# Patient Record
Sex: FEMALE | Race: AMERICAN INDIAN OR ALASKA NATIVE
[De-identification: names, ages, dates, MRNs, and addresses within clinical notes are randomized per-mention and may not be internally consistent; named-entity substitution may affect disease eponyms.]

---

## 2020-09-10 NOTE — HISTORY AND PHYSICAL REPORT
History of Present Illness


Date of examination: 20


Chief complaint: 





D&C for missed AB


History of present illness: 





34 yo  with known missed AB for di/di gestation c/b Class III obesity, AMA,

mild intermittent asthma, HTN (on nifedipine), hx HSVII presenting for scheduled

D&C.  No passage of tissue.  Desiring surgical management.





Past History


Past Medical History: asthma, hypertension, other (obestiy)


Past Surgical History: breast surgery, other (abdominoplasty)


GYN History: herpes


Family/Genetic History: none


Social history: other (hx MJ use)





- Obstetrical History


: 3


Para: 1


Hx # Term Pregnancies: 1


Spontaneous Abortions: 1





Review of Systems


All systems: negative (expect HPI)





- Physical Exam


Abdomen: Positive: normal appearance, other (obese)





Results


All other labs normal.








Assessment and Plan





- Patient Problems


(1) Missed  with fetal demise before 20 completed weeks of gestation


Status: Acute   


Plan to address problem: 


--For scheduled D&C


--Consented in the chart








(2) Chronic hypertension


Status: Acute   


Plan to address problem: 


--Continue home nifedipine

## 2020-09-11 ENCOUNTER — HOSPITAL ENCOUNTER (OUTPATIENT)
Dept: HOSPITAL 5 - OR | Age: 35
Discharge: HOME | End: 2020-09-11
Attending: OBSTETRICS & GYNECOLOGY
Payer: MEDICAID

## 2020-09-11 VITALS — SYSTOLIC BLOOD PRESSURE: 114 MMHG | DIASTOLIC BLOOD PRESSURE: 69 MMHG

## 2020-09-11 DIAGNOSIS — Z72.89: ICD-10-CM

## 2020-09-11 DIAGNOSIS — J45.909: ICD-10-CM

## 2020-09-11 DIAGNOSIS — I10: ICD-10-CM

## 2020-09-11 DIAGNOSIS — Z3A.12: ICD-10-CM

## 2020-09-11 DIAGNOSIS — E66.9: ICD-10-CM

## 2020-09-11 DIAGNOSIS — O02.1: Primary | ICD-10-CM

## 2020-09-11 DIAGNOSIS — Z79.899: ICD-10-CM

## 2020-09-11 DIAGNOSIS — Z98.890: ICD-10-CM

## 2020-09-11 LAB
BASOPHILS # (AUTO): 0 K/MM3 (ref 0–0.1)
BASOPHILS NFR BLD AUTO: 0.5 % (ref 0–1.8)
EOSINOPHIL # BLD AUTO: 0.1 K/MM3 (ref 0–0.4)
EOSINOPHIL NFR BLD AUTO: 1.3 % (ref 0–4.3)
HCT VFR BLD CALC: 28 % (ref 30.3–42.9)
HGB BLD-MCNC: 8.8 GM/DL (ref 10.1–14.3)
LYMPHOCYTES # BLD AUTO: 1.8 K/MM3 (ref 1.2–5.4)
LYMPHOCYTES NFR BLD AUTO: 17.4 % (ref 13.4–35)
MCHC RBC AUTO-ENTMCNC: 32 % (ref 30–34)
MCV RBC AUTO: 64 FL (ref 79–97)
MONOCYTES # (AUTO): 0.4 K/MM3 (ref 0–0.8)
MONOCYTES % (AUTO): 3.4 % (ref 0–7.3)
PLATELET # BLD: 378 K/MM3 (ref 140–440)
RBC # BLD AUTO: 4.41 M/MM3 (ref 3.65–5.03)

## 2020-09-11 PROCEDURE — 86901 BLOOD TYPING SEROLOGIC RH(D): CPT

## 2020-09-11 PROCEDURE — 76998 US GUIDE INTRAOP: CPT

## 2020-09-11 PROCEDURE — 85025 COMPLETE CBC W/AUTO DIFF WBC: CPT

## 2020-09-11 PROCEDURE — 36415 COLL VENOUS BLD VENIPUNCTURE: CPT

## 2020-09-11 PROCEDURE — 86900 BLOOD TYPING SEROLOGIC ABO: CPT

## 2020-09-11 PROCEDURE — 88305 TISSUE EXAM BY PATHOLOGIST: CPT

## 2020-09-11 PROCEDURE — 76815 OB US LIMITED FETUS(S): CPT

## 2020-09-11 PROCEDURE — 59820 CARE OF MISCARRIAGE: CPT

## 2020-09-11 PROCEDURE — 86850 RBC ANTIBODY SCREEN: CPT

## 2020-09-11 PROCEDURE — 76857 US EXAM PELVIC LIMITED: CPT

## 2020-09-11 RX ADMIN — HYDROMORPHONE HYDROCHLORIDE PRN MG: 1 INJECTION, SOLUTION INTRAMUSCULAR; INTRAVENOUS; SUBCUTANEOUS at 16:41

## 2020-09-11 RX ADMIN — HYDROMORPHONE HYDROCHLORIDE PRN MG: 1 INJECTION, SOLUTION INTRAMUSCULAR; INTRAVENOUS; SUBCUTANEOUS at 16:11

## 2020-09-11 RX ADMIN — HYDROMORPHONE HYDROCHLORIDE PRN MG: 1 INJECTION, SOLUTION INTRAMUSCULAR; INTRAVENOUS; SUBCUTANEOUS at 16:21

## 2020-09-11 NOTE — POST OPERATIVE NOTE
Pre-op diagnosis: Missed 


Post-op diagnosis: same


Findings: 





1. 12 week uterus


2. Moderate amounts of products of conception


Procedure: 





Dilation and curettage


Anesthesia: GETA


Surgeon: MALIK CAR JR


Estimated blood loss: other (25cc)


Pathology: list (Moderate amounts of products of contraception)


Specimen disposition: to lab


Condition: stable


Disposition: PACU

## 2020-09-11 NOTE — ANESTHESIA CONSULTATION
Anesthesia Consult and Med Hx


Date of service: 09/11/20





- Airway


Anesthetic Teeth Evaluation: Good


ROM Head & Neck: Adequate


Mental/Hyoid Distance: Adequate


Mallampati Class: Class II


Intubation Access Assessment: Good





- Pre-Operative Health Status


ASA Pre-Surgery Classification: ASA3


Proposed Anesthetic Plan: General





- Pulmonary


Hx Asthma: Yes (Not treated since age 13)





- Cardiovascular System


Hx Hypertension: Yes





- Central Nervous System


Hx Psychiatric Problems: No





- Hematic


Hx Anemia: Yes (8.8)





- Other Systems


Hx Alcohol Use: Yes (Quit 4 mos ago)


Hx Substance Use: Yes (Marijuana quit 4 mos ago)


Hx Cancer: No


Hx Obesity: Yes





- Additional Comments


Anesthesia Medical History Comments: Pt seen at 1204PM

## 2020-09-11 NOTE — PROCEDURE NOTE
Date of procedure: 20


Pre-op diagnosis: Missed 


Post-op diagnosis: same


Procedure: 





Preoperative diagnosis:


1.  Missed  


Postoperative diagnosis:


1.  Same





Operation performed:


1.  Examined under anesthesia


2.  Dilation & curettage





Surgeon: Malik Car


Anesthesia: General


EBL: 25 cc


Pathology specimens: Uterine contents 


Complications: none


Disposition and condition: To the PACU in stable condition then discharged home


Findings:


1.  12 week size mobile uterus


2.  Moderate amount of products of conception





Statement of medical necessity: 35-year-old -0-1-1 at 11 weeks with missed 

 of di-di-twin gestation.  She desired surgical management.  The 

procedure risk benefits, indications and alternatives reviewed patient.





Description of operation: After informed consent, the patient was taken to the 

OR and placed in Dagoberto stirrups after general anesthesia was administered.  An 

exam under anesthesia was performed with the findings noted above.  The vagina 

was prepped and draped in the usual sterile fashion.  Did not catheterization of

the bladder was performed.  A duckbill speculum was placed to visualize the 

cervix.  A single-tooth tenaculum was placed onto the anterior cervical lip.  

Serial dilation of the cervix with Kim dilators was performed.  The uterus was

gently sounded to 10 centimeters.  Suction was calibrated to 60 mmHg, and a 14 

millimeters curette was gently advanced into the uterine cavity fundus.  Suction

was applied, and the curette was rotated to evacuate the uterus of products of 

conception.  A sharp curettage was performed until a gritty texture was noted.  

Suction curettage was repeated to clear the remaining products of conception.  

Minimal bleeding was noted.  The tenaculum was removed from the cervix with good

hemostasis noted.  The speculum was removed.  Patient tolerated the procedure 

well and was taken to recovery room in good condition.








Anesthesia: GETA


Surgeon: MALIK CAR JR


Estimated blood loss: other (25)


IV fluids: 1,000


Urine output: 25


Pathology: list (Products of contraception)


Specimen disposition: to lab


Condition: stable


Disposition: PACU

## 2020-09-11 NOTE — ULTRASOUND REPORT
Limited OB Ultrasound



HISTORY: d c of twins gestation.



TECHNIQUE:  Grayscale and color  imaging performed.



COMPARISON:  None



FINDINGS: Uterus measures 13 x 9.2 x 10.3 cm and appears heterogeneous. There was limited imaging wit
h endometrial echocomplex measuring 2.3 cm. No gross complication identified. No gross intrauterine m
ass.



IMPRESSION: Limited imaging provided for intraoperative guidance.



Signer Name: Werner Bell MD 

Signed: 9/11/2020 7:17 PM

Workstation Name: [id number]

## 2021-01-21 ENCOUNTER — OFFICE VISIT (OUTPATIENT)
Dept: URBAN - METROPOLITAN AREA CLINIC 23 | Facility: CLINIC | Age: 36
End: 2021-01-21
Payer: MEDICAID

## 2021-01-21 DIAGNOSIS — D50.9 MICROCYTIC ANEMIA: ICD-10-CM

## 2021-01-21 DIAGNOSIS — N85.8 UTERINE MASS: ICD-10-CM

## 2021-01-21 DIAGNOSIS — R11.0 NAUSEA: ICD-10-CM

## 2021-01-21 DIAGNOSIS — N92.0 MENORRHAGIA: ICD-10-CM

## 2021-01-21 DIAGNOSIS — E66.9 OBESITY: ICD-10-CM

## 2021-01-21 DIAGNOSIS — R68.81 EARLY SATIETY: ICD-10-CM

## 2021-01-21 DIAGNOSIS — R10.13 DYSPEPSIA: ICD-10-CM

## 2021-01-21 DIAGNOSIS — K92.0 HEMATEMESIS: ICD-10-CM

## 2021-01-21 DIAGNOSIS — R10.10 UPPER ABDOMINAL PAIN: ICD-10-CM

## 2021-01-21 PROBLEM — 414916001 OBESITY: Status: ACTIVE | Noted: 2021-01-21

## 2021-01-21 PROBLEM — 386692008 MENORRHAGIA: Status: ACTIVE | Noted: 2021-01-21

## 2021-01-21 PROCEDURE — G8417 CALC BMI ABV UP PARAM F/U: HCPCS | Performed by: INTERNAL MEDICINE

## 2021-01-21 PROCEDURE — G9903 PT SCRN TBCO ID AS NON USER: HCPCS | Performed by: INTERNAL MEDICINE

## 2021-01-21 PROCEDURE — G8427 DOCREV CUR MEDS BY ELIG CLIN: HCPCS | Performed by: INTERNAL MEDICINE

## 2021-01-21 PROCEDURE — G8482 FLU IMMUNIZE ORDER/ADMIN: HCPCS | Performed by: INTERNAL MEDICINE

## 2021-01-21 PROCEDURE — 99204 OFFICE O/P NEW MOD 45 MIN: CPT | Performed by: INTERNAL MEDICINE

## 2021-01-21 NOTE — HPI-TODAY'S VISIT:
- 36 yo  female, self-referred for further evaluation of GI complaints as detailed below - States she has been experiencing abdominal pain for about 1 month now.  The pain is intermittent but occurs daily.  She describes epigastric pain which varies between burning and a pressure sensation.  The pain is not associated with eating.  She gets the pains randomly, about 4-5 times per day.  The pains may last 2-3 hours. - Associated symptoms include nausea, dyspepsia, and early satiety.  Does not take ASA or NSAIDs. - States she has had hematemesis twice over the past 3 weeks.  The first time was on January 8, and she went to the ER at [facility].  Bloodwork was notable for microcytic anemia with a Hb of 10, MCV 58.  Abdominopelvic CT was notable for a large uterine mass measuring 8 cm, most likely a leiomyoma vs. other.  Patient states she saw a gynecologist this morning and that they are considering surgical excision.  States she has heavy menses which last about 8 days.  She denies hematochezia. - Denies family history of GI malignancies in any first degree relatives

## 2021-01-22 ENCOUNTER — OFFICE VISIT (OUTPATIENT)
Dept: URBAN - METROPOLITAN AREA CLINIC 22 | Facility: CLINIC | Age: 36
End: 2021-01-22
Payer: MEDICAID

## 2021-01-22 DIAGNOSIS — R10.84 GENERALIZED ABDOMINAL PAIN: ICD-10-CM

## 2021-01-22 DIAGNOSIS — K80.20 CHOLELITHIASIS: ICD-10-CM

## 2021-01-22 PROCEDURE — 76705 ECHO EXAM OF ABDOMEN: CPT | Performed by: INTERNAL MEDICINE

## 2021-02-08 ENCOUNTER — OFFICE VISIT (OUTPATIENT)
Dept: URBAN - METROPOLITAN AREA CLINIC 23 | Facility: CLINIC | Age: 36
End: 2021-02-08

## 2021-02-18 ENCOUNTER — OFFICE VISIT (OUTPATIENT)
Dept: URBAN - METROPOLITAN AREA SURGERY CENTER 14 | Facility: SURGERY CENTER | Age: 36
End: 2021-02-18

## 2021-07-26 ENCOUNTER — TELEPHONE ENCOUNTER (OUTPATIENT)
Dept: URBAN - METROPOLITAN AREA CLINIC 23 | Facility: CLINIC | Age: 36
End: 2021-07-26

## 2021-07-26 RX ORDER — FAMOTIDINE 20 MG/1
1 TABLET, 30 MINUTES BEFORE BREAKFAST AND DINNER TABLET, FILM COATED ORAL TWICE A DAY
Qty: 180 | Refills: 1

## 2021-08-17 ENCOUNTER — OFFICE VISIT (OUTPATIENT)
Dept: URBAN - METROPOLITAN AREA SURGERY CENTER 14 | Facility: SURGERY CENTER | Age: 36
End: 2021-08-17
Payer: MEDICAID

## 2021-08-17 DIAGNOSIS — K29.60 ADENOPAPILLOMATOSIS GASTRICA: ICD-10-CM

## 2021-08-17 DIAGNOSIS — K92.0 BLOODY EMESIS: ICD-10-CM

## 2021-08-17 DIAGNOSIS — K31.89 ACQUIRED DEFORMITY OF DUODENUM: ICD-10-CM

## 2021-08-17 DIAGNOSIS — K21.00 ALKALINE REFLUX ESOPHAGITIS: ICD-10-CM

## 2021-08-17 PROCEDURE — 43239 EGD BIOPSY SINGLE/MULTIPLE: CPT | Performed by: INTERNAL MEDICINE

## 2021-08-17 PROCEDURE — G8907 PT DOC NO EVENTS ON DISCHARG: HCPCS | Performed by: INTERNAL MEDICINE

## 2021-08-17 RX ORDER — FAMOTIDINE 20 MG/1
1 TABLET, 30 MINUTES BEFORE BREAKFAST AND DINNER TABLET, FILM COATED ORAL TWICE A DAY
Qty: 180 | Refills: 1 | Status: ACTIVE | COMMUNITY

## 2021-08-19 ENCOUNTER — OFFICE VISIT (OUTPATIENT)
Dept: URBAN - METROPOLITAN AREA CLINIC 23 | Facility: CLINIC | Age: 36
End: 2021-08-19

## 2021-08-31 ENCOUNTER — TELEPHONE ENCOUNTER (OUTPATIENT)
Dept: URBAN - METROPOLITAN AREA CLINIC 92 | Facility: CLINIC | Age: 36
End: 2021-08-31

## 2021-08-31 PROBLEM — 234349007 MICROCYTIC ANEMIA: Status: ACTIVE | Noted: 2021-01-21

## 2021-09-24 ENCOUNTER — TELEPHONE ENCOUNTER (OUTPATIENT)
Dept: URBAN - METROPOLITAN AREA CLINIC 23 | Facility: CLINIC | Age: 36
End: 2021-09-24

## 2021-09-24 RX ORDER — DICYCLOMINE HYDROCHLORIDE 20 MG/1
1 TABLET TABLET ORAL
Qty: 360 | Refills: 3 | OUTPATIENT
Start: 2021-09-24 | End: 2022-09-19

## 2021-09-24 RX ORDER — FAMOTIDINE 20 MG/1
1 TABLET, 30 MINUTES BEFORE BREAKFAST AND DINNER TABLET, FILM COATED ORAL TWICE A DAY
Qty: 180 | Refills: 1 | Status: ACTIVE | COMMUNITY

## 2021-09-27 ENCOUNTER — OFFICE VISIT (OUTPATIENT)
Dept: URBAN - METROPOLITAN AREA CLINIC 25 | Facility: CLINIC | Age: 36
End: 2021-09-27

## 2021-09-28 ENCOUNTER — TELEPHONE ENCOUNTER (OUTPATIENT)
Dept: URBAN - METROPOLITAN AREA CLINIC 23 | Facility: CLINIC | Age: 36
End: 2021-09-28

## 2021-10-25 ENCOUNTER — OFFICE VISIT (OUTPATIENT)
Dept: URBAN - METROPOLITAN AREA CLINIC 23 | Facility: CLINIC | Age: 36
End: 2021-10-25

## 2022-03-28 ENCOUNTER — TELEPHONE ENCOUNTER (OUTPATIENT)
Dept: URBAN - METROPOLITAN AREA CLINIC 96 | Facility: CLINIC | Age: 37
End: 2022-03-28

## 2022-03-28 RX ORDER — DICYCLOMINE HYDROCHLORIDE 20 MG/1
1 TABLET TABLET ORAL
Qty: 360 | Refills: 0
Start: 2021-09-24 | End: 2022-07-16

## 2022-05-31 ENCOUNTER — WEB ENCOUNTER (OUTPATIENT)
Dept: URBAN - METROPOLITAN AREA CLINIC 25 | Facility: CLINIC | Age: 37
End: 2022-05-31

## 2022-05-31 ENCOUNTER — OFFICE VISIT (OUTPATIENT)
Dept: URBAN - METROPOLITAN AREA CLINIC 25 | Facility: CLINIC | Age: 37
End: 2022-05-31
Payer: MEDICAID

## 2022-05-31 DIAGNOSIS — R10.9 UNSPECIFIED ABDOMINAL PAIN: ICD-10-CM

## 2022-05-31 DIAGNOSIS — G89.29 OTHER CHRONIC PAIN: ICD-10-CM

## 2022-05-31 DIAGNOSIS — K52.839 MICROSCOPIC COLITIS, UNSPECIFIED MICROSCOPIC COLITIS TYPE: ICD-10-CM

## 2022-05-31 PROBLEM — 235753003: Status: ACTIVE | Noted: 2022-05-31

## 2022-05-31 PROBLEM — 82423001: Status: ACTIVE | Noted: 2022-05-31

## 2022-05-31 PROCEDURE — 99213 OFFICE O/P EST LOW 20 MIN: CPT | Performed by: INTERNAL MEDICINE

## 2022-05-31 PROCEDURE — 83516 IMMUNOASSAY NONANTIBODY: CPT | Performed by: INTERNAL MEDICINE

## 2022-05-31 RX ORDER — DICYCLOMINE HYDROCHLORIDE 20 MG/1
2 CAPSULES TABLET ORAL
Qty: 180 CAPSULE | Refills: 4 | OUTPATIENT
Start: 2022-05-31 | End: 2022-10-28

## 2022-05-31 NOTE — HPI-TODAY'S VISIT:
38 yo pt presents for evaluation of chronic abd pain with unremarkable w/u thus far except for biopsy proven microscopic colitis. Sxs have been present for several months. Denies f/c/n/v/wt loss.

## 2022-08-18 ENCOUNTER — OFFICE VISIT (OUTPATIENT)
Dept: URBAN - METROPOLITAN AREA CLINIC 25 | Facility: CLINIC | Age: 37
End: 2022-08-18

## 2022-08-18 RX ORDER — DICYCLOMINE HYDROCHLORIDE 20 MG/1
2 CAPSULES TABLET ORAL
Qty: 180 CAPSULE | Refills: 4 | COMMUNITY
Start: 2022-05-31 | End: 2022-10-28

## 2022-09-08 ENCOUNTER — OFFICE VISIT (OUTPATIENT)
Dept: URBAN - METROPOLITAN AREA CLINIC 25 | Facility: CLINIC | Age: 37
End: 2022-09-08

## 2023-09-28 ENCOUNTER — OFFICE VISIT (OUTPATIENT)
Dept: URBAN - METROPOLITAN AREA CLINIC 25 | Facility: CLINIC | Age: 38
End: 2023-09-28

## 2024-03-18 ENCOUNTER — OV EP (OUTPATIENT)
Dept: URBAN - METROPOLITAN AREA CLINIC 25 | Facility: CLINIC | Age: 39
End: 2024-03-18

## 2024-03-18 NOTE — HPI-TODAY'S VISIT:
March 2024 visit:Previous workup includes a EGD in 2021 with Dr. Wise that revealed a small hiatal hernia, irregular Z-line, increased intraepithelial lymphocytes noted on small bowel biopsy, no H. pylori infection, reflux esophagitis with no intestinal metaplasia.  Abdominal ultrasound in 2021 revealed gallstones, normal liver, patent portal vein

## 2024-05-10 ENCOUNTER — LAB OUTSIDE AN ENCOUNTER (OUTPATIENT)
Dept: URBAN - METROPOLITAN AREA CLINIC 92 | Facility: CLINIC | Age: 39
End: 2024-05-10

## 2024-05-10 ENCOUNTER — DASHBOARD ENCOUNTERS (OUTPATIENT)
Age: 39
End: 2024-05-10

## 2024-05-10 ENCOUNTER — OFFICE VISIT (OUTPATIENT)
Dept: URBAN - METROPOLITAN AREA CLINIC 92 | Facility: CLINIC | Age: 39
End: 2024-05-10
Payer: MEDICAID

## 2024-05-10 VITALS
DIASTOLIC BLOOD PRESSURE: 103 MMHG | TEMPERATURE: 97.1 F | SYSTOLIC BLOOD PRESSURE: 162 MMHG | HEIGHT: 65 IN | WEIGHT: 221 LBS | BODY MASS INDEX: 36.82 KG/M2 | HEART RATE: 69 BPM

## 2024-05-10 DIAGNOSIS — R10.84 DIFFUSE ABDOMINAL PAIN: ICD-10-CM

## 2024-05-10 PROCEDURE — 99214 OFFICE O/P EST MOD 30 MIN: CPT | Performed by: INTERNAL MEDICINE

## 2024-05-10 RX ORDER — LABETALOL HYDROCHLORIDE 300 MG/1
1 TABLET TABLET, FILM COATED ORAL TWICE A DAY
Status: ACTIVE | COMMUNITY

## 2024-05-13 LAB
A/G RATIO: 1.4
ABSOLUTE BASOPHILS: 41
ABSOLUTE EOSINOPHILS: 324
ABSOLUTE LYMPHOCYTES: 1884
ABSOLUTE MONOCYTES: 290
ABSOLUTE NEUTROPHILS: 4361
ALBUMIN: 4.2
ALKALINE PHOSPHATASE: 64
ALT (SGPT): 6
AST (SGOT): 12
BASOPHILS: 0.6
BILIRUBIN, TOTAL: 0.6
BUN/CREATININE RATIO: (no result)
BUN: 10
CALCIUM: 9.2
CARBON DIOXIDE, TOTAL: 22
CBC MORPHOLOGY: (no result)
CHLORIDE: 107
CREATININE: 0.71
EGFR: 110
EOSINOPHILS: 4.7
GLOBULIN, TOTAL: 2.9
GLUCOSE: 92
HEMATOCRIT: 39.3
HEMOGLOBIN: 11.5
IMMUNOGLOBULIN A: 146
INTERPRETATION: (no result)
LIPASE: 29
LYMPHOCYTES: 27.3
MCH: 20.1
MCHC: 29.3
MCV: 68.8
MONOCYTES: 4.2
MPV: 12
NEUTROPHILS: 63.2
PLATELET COUNT: 351
POTASSIUM: 3.9
PROTEIN, TOTAL: 7.1
RDW: 17.1
RED BLOOD CELL COUNT: 5.71
SODIUM: 141
TISSUE TRANSGLUTAMINASE AB, IGA: <1
WHITE BLOOD CELL COUNT: 6.9

## 2024-06-19 ENCOUNTER — TELEPHONE ENCOUNTER (OUTPATIENT)
Dept: URBAN - METROPOLITAN AREA CLINIC 92 | Facility: CLINIC | Age: 39
End: 2024-06-19

## 2024-09-09 ENCOUNTER — LAB OUTSIDE AN ENCOUNTER (OUTPATIENT)
Dept: URBAN - METROPOLITAN AREA CLINIC 92 | Facility: CLINIC | Age: 39
End: 2024-09-09

## 2024-09-09 ENCOUNTER — OFFICE VISIT (OUTPATIENT)
Dept: URBAN - METROPOLITAN AREA CLINIC 92 | Facility: CLINIC | Age: 39
End: 2024-09-09
Payer: MEDICAID

## 2024-09-09 VITALS
HEART RATE: 66 BPM | SYSTOLIC BLOOD PRESSURE: 158 MMHG | BODY MASS INDEX: 35.52 KG/M2 | HEIGHT: 65 IN | TEMPERATURE: 97.2 F | DIASTOLIC BLOOD PRESSURE: 103 MMHG | WEIGHT: 213.2 LBS

## 2024-09-09 DIAGNOSIS — R10.84 DIFFUSE ABDOMINAL PAIN: ICD-10-CM

## 2024-09-09 DIAGNOSIS — R10.13 EPIGASTRIC ABDOMINAL PAIN: ICD-10-CM

## 2024-09-09 DIAGNOSIS — R89.7 ABNORMAL BIOPSY RESULT: ICD-10-CM

## 2024-09-09 PROCEDURE — 99214 OFFICE O/P EST MOD 30 MIN: CPT | Performed by: INTERNAL MEDICINE

## 2024-09-09 RX ORDER — OMEPRAZOLE 40 MG/1
1 CAPSULE 30 MINUTES BEFORE MORNING MEAL CAPSULE, DELAYED RELEASE ORAL ONCE A DAY
Qty: 90 | Refills: 3 | OUTPATIENT
Start: 2024-09-09

## 2024-09-09 RX ORDER — LABETALOL HYDROCHLORIDE 300 MG/1
1 TABLET TABLET, FILM COATED ORAL TWICE A DAY
Status: ACTIVE | COMMUNITY

## 2024-09-09 NOTE — HPI-TODAY'S VISIT:
March 2024 visit:Previous workup includes a EGD in 2021 with Dr. Wise that revealed a small hiatal hernia, irregular Z-line, increased intraepithelial lymphocytes noted on small bowel biopsy, no H. pylori infection, reflux esophagitis with no intestinal metaplasia.  Abdominal ultrasound in 2021 revealed gallstones, normal liver, patent portal vein  ***5/2024: Notes 6 years of abdominal pain. She never had celiac testing, and she did not see Surgery. She gets debilitating periods of generalized abdominal pain that persists for 3 weeks, and is associated with nausea. Then improves spontaneously. Hx of fibroids. BMs are regular, occ constipation. Pain worse with movement. No improvement with famotidine, Bentyl. Losing weight, unk how much. Does not like to eat when she is in pain. No CRC in the fam.  ***9/2024: No appetite, bloated, central abdominal pain. Constipated for a few days, and pain and appetite improves w/BM, but does note ongoing bloating. Vomited 2 days ago. Occasional nausea. Has lost 10# due to poor appetite. She is not eating 2/2 pain, although pain does not get worse with PO intake. CTAP 5/2024 with fibroids. RUQ US 2021 with gallstones. Went to ER with CP - thought may be GI in etiology, but sending to Cards. Celiac testing neg.

## 2024-10-04 ENCOUNTER — OFFICE VISIT (OUTPATIENT)
Dept: URBAN - METROPOLITAN AREA CLINIC 91 | Facility: CLINIC | Age: 39
End: 2024-10-04
Payer: MEDICAID

## 2024-10-04 DIAGNOSIS — K80.20 CHOLELITHIASIS: ICD-10-CM

## 2024-10-04 PROCEDURE — 76705 ECHO EXAM OF ABDOMEN: CPT | Performed by: INTERNAL MEDICINE

## 2024-10-04 RX ORDER — OMEPRAZOLE 40 MG/1
1 CAPSULE 30 MINUTES BEFORE MORNING MEAL CAPSULE, DELAYED RELEASE ORAL ONCE A DAY
Qty: 90 | Refills: 3 | Status: ACTIVE | COMMUNITY
Start: 2024-09-09

## 2024-10-04 RX ORDER — LABETALOL HYDROCHLORIDE 300 MG/1
1 TABLET TABLET, FILM COATED ORAL TWICE A DAY
Status: ACTIVE | COMMUNITY

## 2024-10-07 ENCOUNTER — TELEPHONE ENCOUNTER (OUTPATIENT)
Dept: URBAN - METROPOLITAN AREA CLINIC 92 | Facility: CLINIC | Age: 39
End: 2024-10-07

## 2024-10-07 ENCOUNTER — LAB OUTSIDE AN ENCOUNTER (OUTPATIENT)
Dept: URBAN - METROPOLITAN AREA CLINIC 92 | Facility: CLINIC | Age: 39
End: 2024-10-07

## 2024-10-09 ENCOUNTER — LAB OUTSIDE AN ENCOUNTER (OUTPATIENT)
Dept: URBAN - METROPOLITAN AREA CLINIC 92 | Facility: CLINIC | Age: 39
End: 2024-10-09

## 2024-10-11 LAB — H PYLORI BREATH TEST: NOT DETECTED

## 2024-11-05 ENCOUNTER — OFFICE VISIT (OUTPATIENT)
Dept: URBAN - METROPOLITAN AREA CLINIC 92 | Facility: CLINIC | Age: 39
End: 2024-11-05